# Patient Record
Sex: FEMALE | Race: BLACK OR AFRICAN AMERICAN | NOT HISPANIC OR LATINO | Employment: STUDENT | URBAN - METROPOLITAN AREA
[De-identification: names, ages, dates, MRNs, and addresses within clinical notes are randomized per-mention and may not be internally consistent; named-entity substitution may affect disease eponyms.]

---
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
## 2024-07-10 ENCOUNTER — HOSPITAL ENCOUNTER (EMERGENCY)
Facility: HOSPITAL | Age: 19
Discharge: HOME | End: 2024-07-10
Attending: STUDENT IN AN ORGANIZED HEALTH CARE EDUCATION/TRAINING PROGRAM
Payer: MEDICAID

## 2024-07-10 VITALS
HEART RATE: 66 BPM | SYSTOLIC BLOOD PRESSURE: 118 MMHG | WEIGHT: 250 LBS | TEMPERATURE: 99 F | RESPIRATION RATE: 18 BRPM | DIASTOLIC BLOOD PRESSURE: 64 MMHG | OXYGEN SATURATION: 100 % | BODY MASS INDEX: 40.18 KG/M2 | HEIGHT: 66 IN

## 2024-07-10 DIAGNOSIS — R22.0 TONGUE SWELLING: ICD-10-CM

## 2024-07-10 DIAGNOSIS — T78.40XA ALLERGIC REACTION, INITIAL ENCOUNTER: Primary | ICD-10-CM

## 2024-07-10 PROCEDURE — 99284 EMERGENCY DEPT VISIT MOD MDM: CPT | Mod: 25

## 2024-07-10 PROCEDURE — 25000003 PHARM REV CODE 250: Performed by: STUDENT IN AN ORGANIZED HEALTH CARE EDUCATION/TRAINING PROGRAM

## 2024-07-10 PROCEDURE — 63600175 PHARM REV CODE 636 W HCPCS: Performed by: STUDENT IN AN ORGANIZED HEALTH CARE EDUCATION/TRAINING PROGRAM

## 2024-07-10 PROCEDURE — 96375 TX/PRO/DX INJ NEW DRUG ADDON: CPT

## 2024-07-10 PROCEDURE — 96374 THER/PROPH/DIAG INJ IV PUSH: CPT

## 2024-07-10 RX ORDER — DIPHENHYDRAMINE HYDROCHLORIDE 50 MG/ML
25 INJECTION INTRAMUSCULAR; INTRAVENOUS
Status: COMPLETED | OUTPATIENT
Start: 2024-07-10 | End: 2024-07-10

## 2024-07-10 RX ORDER — FAMOTIDINE 10 MG/ML
20 INJECTION INTRAVENOUS
Status: COMPLETED | OUTPATIENT
Start: 2024-07-10 | End: 2024-07-10

## 2024-07-10 RX ORDER — METHYLPREDNISOLONE SOD SUCC 125 MG
125 VIAL (EA) INJECTION
Status: COMPLETED | OUTPATIENT
Start: 2024-07-10 | End: 2024-07-10

## 2024-07-10 RX ADMIN — METHYLPREDNISOLONE SODIUM SUCCINATE 125 MG: 125 INJECTION, POWDER, FOR SOLUTION INTRAMUSCULAR; INTRAVENOUS at 10:07

## 2024-07-10 RX ADMIN — DIPHENHYDRAMINE HYDROCHLORIDE 25 MG: 50 INJECTION, SOLUTION INTRAMUSCULAR; INTRAVENOUS at 10:07

## 2024-07-10 RX ADMIN — FAMOTIDINE 20 MG: 10 INJECTION, SOLUTION INTRAVENOUS at 10:07

## 2024-07-10 NOTE — ED TRIAGE NOTES
Pt arrives to ED with complaints of a swollen tongue . Pt was eating shrimp and grits at 9 this morning  and then her tongue started to swell.  Pt endorses voice change and is able to swallow water. Pt took two benadryl  before coming to ED.

## 2024-07-10 NOTE — DISCHARGE INSTRUCTIONS
It was a pleasure taking care of you today!     Diagnosis: Allergic Reaction    Home Care:   - You may take benadryl 25mg every 6-8 hours as needed for recurrence of symptoms    Follow-Up Plan:  - Follow-up with primary care doctor within 3 - 5 days to discuss your recent ER visit and any additional concerns that you may have.  - Additional testing and/or evaluation as directed by your primary doctor    Return to the Emergency Department for symptoms including but not limited to: persistence or worsening of symptoms, shortness of breath or chest pain, inability to drink without vomiting, passing out/fainting/ loss of consciousness, or if you have other concerns.

## 2024-07-10 NOTE — ED PROVIDER NOTES
Encounter Date: 7/10/2024       History     Chief Complaint   Patient presents with    Allergic Reaction     Ate shrimp at 9am today, now having tongue swelling. Increased difficulty tolerating secretions       19-year-old female with no chronic PMH presents with tongue swelling.  Patient states she was having some sharp and crit for breakfast and quickly developed swelling of her tongue.  This started around 9:00 a.m..  She had progressive swelling of her tongue that started him involved her lips on the drive to the ED but since being in the ED, she states it has stayed the same.  She took 2 Benadryl prior to coming to the ED. She reports some mild difficulty swallowing.  Denies shortness of breath, feeling like throat is closing up, wheezing, vomiting, diarrhea, abdominal pain, chest pain, shortness of breath.  Denies history of allergic reaction or anaphylaxis.  Family history of shellfish allergy.  ROS otherwise negative.    The history is provided by the patient.     Review of patient's allergies indicates:   Allergen Reactions    Monistat 1 combo pack [miconazole nitrate] Itching     Made symptoms worse     History reviewed. No pertinent past medical history.  Past Surgical History:   Procedure Laterality Date    adnoids      TYMPANOSTOMY TUBE PLACEMENT      TYMPANOSTOMY TUBE PLACEMENT       Family History   Problem Relation Name Age of Onset    Diabetes Mother          type 1 diabetes    Cataracts Maternal Grandmother      Glaucoma Maternal Grandmother      Diabetes Paternal Grandmother      Breast cancer Paternal Grandmother      Breast cancer Other Maternal great aunt     No Known Problems Father      No Known Problems Sister      Amblyopia Neg Hx      Blindness Neg Hx      Retinal detachment Neg Hx      Strabismus Neg Hx       Social History     Tobacco Use    Smoking status: Never    Smokeless tobacco: Never   Substance Use Topics    Alcohol use: No    Drug use: No     Review of Systems    Physical Exam      Initial Vitals [07/10/24 0928]   BP Pulse Resp Temp SpO2   139/71 62 18 99 °F (37.2 °C) 97 %      MAP       --         Physical Exam    Nursing note and vitals reviewed.  Constitutional: She appears well-developed and well-nourished. She is not diaphoretic. No distress.   HENT:   Head: Normocephalic and atraumatic.   Mouth/Throat: Oropharynx is clear and moist.    Moderate tongue swelling and minimal lip swelling   Eyes: Conjunctivae and EOM are normal. Pupils are equal, round, and reactive to light.   Neck: Neck supple.   Normal range of motion.  Cardiovascular:  Normal rate, regular rhythm, normal heart sounds and intact distal pulses.           No murmur heard.  Pulmonary/Chest: Breath sounds normal. No respiratory distress. She has no wheezes. She has no rhonchi. She has no rales.     No increased work of breathing or tachypnea   Abdominal: Abdomen is soft. She exhibits no distension. There is no abdominal tenderness. There is no rebound and no guarding.   Musculoskeletal:         General: No tenderness or edema.      Cervical back: Normal range of motion and neck supple.     Neurological: She is alert and oriented to person, place, and time.   Skin: Skin is warm and dry. Capillary refill takes less than 2 seconds.   Psychiatric: She has a normal mood and affect.         ED Course   Procedures  Labs Reviewed   HIV 1 / 2 ANTIBODY   HEPATITIS C ANTIBODY          Imaging Results    None          Medications   methylPREDNISolone sodium succinate injection 125 mg (125 mg Intravenous Given 7/10/24 1020)   famotidine (PF) injection 20 mg (20 mg Intravenous Given 7/10/24 1020)   diphenhydrAMINE injection 25 mg (25 mg Intravenous Given 7/10/24 1020)     Medical Decision Making   Differential diagnoses considered includes allergic reaction, anaphylaxis, angioedema     Patient's only symptoms include tongue and lip swelling and she has no other symptoms or shortness of breath.  Initial management with IV steroid,  Benadryl, and Pepcid.  Patient was re-evaluated consistently and had progressively improved symptoms.  Ultimately her tongue edema completely resolved.  She is hemodynamically and clinically stable and will be discharged at this time after 3 hours of observation.  She has been given home care instructions including over-the-counter Benadryl use. Referred to allergy.     Risk  OTC drugs.                                      Clinical Impression:  Final diagnoses:  [T78.40XA] Allergic reaction, initial encounter (Primary)  [R22.0] Tongue swelling          ED Disposition Condition    Discharge Stable          ED Prescriptions    None       Follow-up Information       Follow up With Specialties Details Why Contact Info    Adali Lima MD Pediatrics Schedule an appointment as soon as possible for a visit  To discuss your recent ER visit and any additional concerns that you may have [address] [facility] [address]  [phone]      [facility] - Emergency Dept Emergency Medicine Go to  As needed, If symptoms worsen [address] [facility] [id number] [phone]    [facility] ALLERGY Allergy   [address] [facility] [address]  [phone]             Enrrique Mckeon MD  07/10/24 1258